# Patient Record
Sex: FEMALE | Race: WHITE | Employment: FULL TIME | URBAN - METROPOLITAN AREA
[De-identification: names, ages, dates, MRNs, and addresses within clinical notes are randomized per-mention and may not be internally consistent; named-entity substitution may affect disease eponyms.]

---

## 2017-05-23 ENCOUNTER — EMPLOYEE WELLNESS (OUTPATIENT)
Dept: OTHER | Age: 32
End: 2017-05-23

## 2017-05-23 LAB
CHOLESTEROL, TOTAL: 179 MG/DL (ref 0–199)
GLUCOSE BLD-MCNC: 76 MG/DL (ref 70–99)
HDLC SERPL-MCNC: 42 MG/DL (ref 40–60)
LDL CHOLESTEROL CALCULATED: 112 MG/DL
TRIGL SERPL-MCNC: 124 MG/DL (ref 0–150)

## 2017-06-16 ENCOUNTER — HOSPITAL ENCOUNTER (OUTPATIENT)
Dept: MRI IMAGING | Age: 32
Discharge: HOME | End: 2017-06-16
Attending: INTERNAL MEDICINE | Admitting: INTERNAL MEDICINE

## 2017-06-16 DIAGNOSIS — M51.9 THORACIC, THORACOLUMBAR AND LUMBOSACRAL INTERVERTEBRAL DISC DISORDER: ICD-10-CM

## 2017-06-16 DIAGNOSIS — M51.9 DISC DISORDER: ICD-10-CM

## 2017-06-16 DIAGNOSIS — M51.9 INTERVERTEBRAL DISC DISORDER: ICD-10-CM

## 2017-06-20 ENCOUNTER — TELEPHONE (OUTPATIENT)
Dept: SURGERY | Age: 32
End: 2017-06-20

## 2017-08-15 LAB
HEPATITIS B SURFACE ANTIGEN INTERPRETATION: NORMAL
HEPATITIS C ANTIBODY INTERPRETATION: NORMAL

## 2017-08-16 LAB
HIV-1 AND HIV-2 ANTIBODIES: NORMAL
RPR: NORMAL

## 2017-08-17 LAB
HPV COMMENT: NORMAL
HPV TYPE 16: NOT DETECTED
HPV TYPE 18: NOT DETECTED
HPVOH (OTHER TYPES): NOT DETECTED

## 2018-03-07 ENCOUNTER — NURSE TRIAGE (OUTPATIENT)
Dept: OTHER | Facility: CLINIC | Age: 33
End: 2018-03-07

## 2018-03-20 VITALS — WEIGHT: 176 LBS | BODY MASS INDEX: 33.25 KG/M2

## 2019-04-27 ENCOUNTER — HOSPITAL ENCOUNTER (OUTPATIENT)
Dept: GENERAL RADIOLOGY | Age: 34
Discharge: HOME | End: 2019-04-27
Payer: COMMERCIAL

## 2019-04-27 ENCOUNTER — HOSPITAL ENCOUNTER (OUTPATIENT)
Age: 34
Discharge: HOME | End: 2019-04-27
Payer: COMMERCIAL

## 2019-04-27 DIAGNOSIS — M54.2 NECK PAIN: ICD-10-CM

## 2019-04-27 PROCEDURE — 72040 X-RAY EXAM NECK SPINE 2-3 VW: CPT

## 2019-05-06 ENCOUNTER — EMPLOYEE WELLNESS (OUTPATIENT)
Dept: OTHER | Age: 34
End: 2019-05-06

## 2019-05-06 LAB
CHOLESTEROL, TOTAL: 182 MG/DL (ref 0–199)
GLUCOSE BLD-MCNC: 93 MG/DL (ref 70–99)
HDLC SERPL-MCNC: 47 MG/DL (ref 40–60)
LDL CHOLESTEROL CALCULATED: 112 MG/DL
TRIGL SERPL-MCNC: 113 MG/DL (ref 0–150)

## 2019-05-13 VITALS — BODY MASS INDEX: 29.85 KG/M2 | WEIGHT: 158 LBS

## 2019-06-06 LAB
CANDIDA SPECIES, DNA PROBE: NORMAL
GARDNERELLA VAGINALIS, DNA PROBE: NORMAL
TRICHOMONAS VAGINALIS DNA: NORMAL

## 2019-09-14 ENCOUNTER — HOSPITAL ENCOUNTER (OUTPATIENT)
Age: 34
Discharge: HOME | End: 2019-09-14
Payer: COMMERCIAL

## 2019-09-14 PROCEDURE — 87390 HIV-1 AG IA: CPT

## 2019-09-14 PROCEDURE — 86702 HIV-2 ANTIBODY: CPT

## 2019-09-14 PROCEDURE — 86701 HIV-1ANTIBODY: CPT

## 2019-09-14 PROCEDURE — 86780 TREPONEMA PALLIDUM: CPT

## 2019-09-14 PROCEDURE — 87340 HEPATITIS B SURFACE AG IA: CPT

## 2019-09-14 PROCEDURE — 86803 HEPATITIS C AB TEST: CPT

## 2019-09-14 PROCEDURE — 36415 COLL VENOUS BLD VENIPUNCTURE: CPT

## 2019-09-15 LAB
HEPATITIS B SURFACE ANTIGEN INTERPRETATION: NORMAL
HEPATITIS C ANTIBODY INTERPRETATION: NORMAL
HIV AG/AB: NORMAL
HIV ANTIGEN: NORMAL
HIV-1 ANTIBODY: NORMAL
HIV-2 AB: NORMAL
TOTAL SYPHILLIS IGG/IGM: NORMAL

## 2019-10-29 ENCOUNTER — TELEPHONE (OUTPATIENT)
Dept: PHARMACY | Age: 34
End: 2019-10-29

## 2019-12-12 ENCOUNTER — OFFICE VISIT (OUTPATIENT)
Dept: ENT CLINIC | Age: 34
End: 2019-12-12
Payer: COMMERCIAL

## 2019-12-12 VITALS
HEIGHT: 61 IN | SYSTOLIC BLOOD PRESSURE: 116 MMHG | WEIGHT: 168.6 LBS | HEART RATE: 92 BPM | DIASTOLIC BLOOD PRESSURE: 72 MMHG | BODY MASS INDEX: 31.83 KG/M2 | TEMPERATURE: 97.3 F

## 2019-12-12 DIAGNOSIS — H65.91 FLUID LEVEL BEHIND TYMPANIC MEMBRANE OF RIGHT EAR: Primary | ICD-10-CM

## 2019-12-12 DIAGNOSIS — H90.2 MIDDLE EAR CONDUCTIVE HEARING LOSS: ICD-10-CM

## 2019-12-12 PROCEDURE — 69420 INCISION OF EARDRUM: CPT | Performed by: OTOLARYNGOLOGY

## 2019-12-12 PROCEDURE — 99243 OFF/OP CNSLTJ NEW/EST LOW 30: CPT | Performed by: OTOLARYNGOLOGY

## 2019-12-12 RX ORDER — METHYLPREDNISOLONE 4 MG/1
TABLET ORAL
Qty: 1 KIT | Refills: 0 | Status: SHIPPED | OUTPATIENT
Start: 2019-12-12 | End: 2019-12-26 | Stop reason: ALTCHOICE

## 2019-12-26 ENCOUNTER — OFFICE VISIT (OUTPATIENT)
Dept: ENT CLINIC | Age: 34
End: 2019-12-26
Payer: COMMERCIAL

## 2019-12-26 VITALS
SYSTOLIC BLOOD PRESSURE: 109 MMHG | HEIGHT: 60 IN | WEIGHT: 168 LBS | TEMPERATURE: 97.2 F | HEART RATE: 79 BPM | BODY MASS INDEX: 32.98 KG/M2 | DIASTOLIC BLOOD PRESSURE: 67 MMHG

## 2019-12-26 DIAGNOSIS — H93.8X1 EAR FULLNESS, RIGHT: ICD-10-CM

## 2019-12-26 DIAGNOSIS — H69.81 DYSFUNCTION OF RIGHT EUSTACHIAN TUBE: Primary | ICD-10-CM

## 2019-12-26 PROCEDURE — 99212 OFFICE O/P EST SF 10 MIN: CPT | Performed by: OTOLARYNGOLOGY

## 2020-01-16 ENCOUNTER — OFFICE VISIT (OUTPATIENT)
Dept: DERMATOLOGY | Age: 35
End: 2020-01-16
Payer: COMMERCIAL

## 2020-01-16 PROBLEM — D48.5 NEOPLASM OF UNCERTAIN BEHAVIOR OF SKIN: Status: ACTIVE | Noted: 2020-01-16

## 2020-01-16 PROCEDURE — 99202 OFFICE O/P NEW SF 15 MIN: CPT | Performed by: DERMATOLOGY

## 2020-01-16 PROCEDURE — 11102 TANGNTL BX SKIN SINGLE LES: CPT | Performed by: DERMATOLOGY

## 2020-01-16 NOTE — LETTER
[facility]at[facility]  [address] [place] [address]  Phone: [phone]  Fax: [phone]    Kamini Fuentes MD        January 16, 2020     Kurt Colunga, [address] [facility],[address] [place] [address]    Patient: Javier Broussard  MR Number: [id number]  YOB: 1985  Date of Visit: 1/16/2020    Dear Dr. Kurt Colunga:    Thank you for the request for consultation for Javier Broussard to me for the evaluation of lesion. Below are the relevant portions of my assessment and plan of care. If you have questions, please do not hesitate to call me. I look forward to following Melodie Watkins along with you.     Sincerely,        Kamini Fuentes MD

## 2020-01-16 NOTE — PROGRESS NOTES
-Aquaphor ointment and bandage applied    -Specimen(s) sent to dermatopathology lab for analysis   -Edu: woundcare, bleeding, infection, scar       Return to Clinic:  prn; may want to have botox  Discussed plan with patient and/or primary caretaker. Patient to call clinic with any questions / concerns. Reviewed side effects of treatment(s) and/or medication(s) with patient and/or primary caretaker.    AVS provided or is available on [facility]   ____________________________________________________________________________   Helen Sandhu MD, MPH, FAAD  [facility], [address] [address]

## 2020-01-20 LAB — DERMATOLOGY PATHOLOGY REPORT: NORMAL

## 2020-01-24 ENCOUNTER — TELEPHONE (OUTPATIENT)
Dept: DERMATOLOGY | Age: 35
End: 2020-01-24

## 2020-01-24 NOTE — TELEPHONE ENCOUNTER
Biopsy Results from 01/16/2020 (scanned into media):     1. Location: [facility]      Diagnosis: Angiofibroma      Plan: No further treatment needed     Left Benign results on patient's voicemail (ok per HIPAA).  Left message instructing to call office with any questions

## 2020-02-11 ENCOUNTER — TELEPHONE (OUTPATIENT)
Dept: DERMATOLOGY | Age: 35
End: 2020-02-11

## 2020-02-11 NOTE — TELEPHONE ENCOUNTER
Dr. Kalpana Harry patient    Patient called and would like to schedule for botox. She also has a cyst on her back she would like removed.     Call back # [phone]

## 2020-03-04 ENCOUNTER — OFFICE VISIT (OUTPATIENT)
Dept: DERMATOLOGY | Age: 35
End: 2020-03-04

## 2020-03-04 PROBLEM — L98.8 RHYTIDES: Status: ACTIVE | Noted: 2020-03-04

## 2020-03-04 PROCEDURE — DM00130 PR DERM ONLY BOTOX INJ LEVEL 4: Performed by: DERMATOLOGY

## 2020-04-09 ENCOUNTER — TELEPHONE (OUTPATIENT)
Dept: PHARMACY | Age: 35
End: 2020-04-09

## 2020-04-10 ENCOUNTER — SCHEDULED TELEPHONE ENCOUNTER (OUTPATIENT)
Dept: PHARMACY | Age: 35
End: 2020-04-10

## 2020-04-10 RX ORDER — M-VIT,TX,IRON,MINS/CALC/FOLIC 27MG-0.4MG
1 TABLET ORAL DAILY
COMMUNITY
End: 2021-07-14

## 2020-04-10 ASSESSMENT — PROMIS GLOBAL HEALTH SCALE
IN THE PAST 7 DAYS, HOW WOULD YOU RATE YOUR FATIGUE ON AVERAGE [ON A SCALE FROM 1 (NONE) TO 5 (VERY SEVERE)]?: 2
IN THE PAST 7 DAYS, HOW WOULD YOU RATE YOUR PAIN ON AVERAGE [ON A SCALE FROM 0 (NO PAIN) TO 10 (WORST IMAGINABLE PAIN)]?: 1
IN GENERAL, HOW WOULD YOU RATE YOUR PHYSICAL HEALTH [ON A SCALE OF 1 (POOR) TO 5 (EXCELLENT)]?: 3
HOW IS THE PROMIS V1.1 BEING ADMINISTERED?: 2
IN GENERAL, HOW WOULD YOU RATE YOUR MENTAL HEALTH, INCLUDING YOUR MOOD AND YOUR ABILITY TO THINK [ON A SCALE OF 1 (POOR) TO 5 (EXCELLENT)]?: 4
TO WHAT EXTENT ARE YOU ABLE TO CARRY OUT YOUR EVERYDAY PHYSICAL ACTIVITIES SUCH AS WALKING, CLIMBING STAIRS, CARRYING GROCERIES, OR MOVING A CHAIR [ON A SCALE OF 1 (NOT AT ALL) TO 5 (COMPLETELY)]?: 5
IN GENERAL, HOW WOULD YOU RATE YOUR SATISFACTION WITH YOUR SOCIAL ACTIVITIES AND RELATIONSHIPS [ON A SCALE OF 1 (POOR) TO 5 (EXCELLENT)]?: 3
IN GENERAL, PLEASE RATE HOW WELL YOU CARRY OUT YOUR USUAL SOCIAL ACTIVITIES (INCLUDES ACTIVITIES AT HOME, AT WORK, AND IN YOUR COMMUNITY, AND RESPONSIBILITIES AS A PARENT, CHILD, SPOUSE, EMPLOYEE, FRIEND, ETC) [ON A SCALE OF 1 (POOR) TO 5 (EXCELLENT)]?: 3
SUM OF RESPONSES TO QUESTIONS 3, 6, 7, & 8: 11
IN GENERAL, WOULD YOU SAY YOUR HEALTH IS...[ON A SCALE OF 1 (POOR) TO 5 (EXCELLENT)]: 5
SUM OF RESPONSES TO QUESTIONS 2, 4, 5, & 10: 15
WHO IS THE PERSON COMPLETING THE PROMIS V1.1 SURVEY?: 0
IN THE PAST 7 DAYS, HOW OFTEN HAVE YOU BEEN BOTHERED BY EMOTIONAL PROBLEMS, SUCH AS FEELING ANXIOUS, DEPRESSED, OR IRRITABLE [ON A SCALE FROM 1 (NEVER) TO 5 (ALWAYS)]?: 3
IN GENERAL, WOULD YOU SAY YOUR QUALITY OF LIFE IS...[ON A SCALE OF 1 (POOR) TO 5 (EXCELLENT)]: 5

## 2020-04-10 NOTE — TELEPHONE ENCOUNTER
other technology to monitor. Also recommended using the restroom prior to bedtime to eliminate night time waking. Patient has no trouble going back to sleep. Reports getting 9-10 hours nightly. Encouraged focus on quality sleep. Skipping meals? No  Too much screen time? No     Considering all the ways in which this illness and health conditions may affect you at this time, how are you doing (0 = very well, 10 = very poorly): 4 - still getting migraines on a regular basis  · During the past 4 weeks, have you missed any planned activities of daily living due to condition (work/school/other plans)? No  · During the past 4 weeks, have you had to seek urgent care, ER admission, Unplanned doctor office visit, or hospital admission? No    MEDICATIONS  Current Outpatient Medications   Medication Sig Dispense Refill    Multiple Vitamins-Minerals (THERAPEUTIC MULTIVITAMIN-MINERALS) tablet Take 1 tablet by mouth daily      ELDERBERRY PO Take 1 tablet by mouth daily      Fremanezumab-vfrm (AJOVY) 225 MG/1.5ML SOSY Inject 1.5 mLs into the skin every 30 days       sertraline (ZOLOFT) 100 MG tablet Take 100 mg by mouth daily        No current facility-administered medications for this visit. Have you used other medications in the past for Migraines? Why did you change therapies? - Emgality - ineffective after a while. Patient reports migraine frequency was better on Emgality than on Ajovy, but all of a sudden Emgality stopped working  - Fioricet - ineffective  - Imitrex - ineffective    Current Specialty Medication(s): Fremanezumab (Virgil Lonnie) - started 10/3/19  Recommended dosing: Migraine prophylaxis: SubQ: 225 mg monthly or 675 mg every 3 months  Warnings/Precautions: Hypersensitivity; Cardiovascular disease (excluded from trials);  Anti-fremanezumab antibodies and neutralizing antibodies may develop  Recommended Monitoring: Number of monthly migraine days  Storage: Store refrigerated at 2°C to 8°C (36°F to 46°F) in the extent are you able to carry out your everyday physical activities such as walking, climbing stairs, carrying groceries, or moving a chair? 5   In the past 7 days how often have you been bothered by emotional problems such as feeling anxious, depressed or irritable? 3   In the past 7 days how would you rate your fatigue on average? 2   In the past 7 days how would you rate your pain on average? 1   How comfortable are you filling out medical forms by yourself? 4   What amount of pain have you experienced in the last week in your other knee/hip? 0   My BACK PAIN at the moment is 2   Mode of Collection 2   Person Completing Survey 0   PROMIS Physical Score 11   PROMIS Mental Score 15       1. Migraine Headache  Patient is currently controlled with current therapy compared to before starting therapy. Patient went from daily migraines to twice weekly with less intense migraines, but patient reports her ideal number per month is < 2. Patient has good knowledge and understanding of current medication and disease state. Dose is  appropriate based on patient weight and laboratory findings. Contraindications to therapy present? No  2. Immunizations  Immunization status: up to date and documented. 3. Drug Interactions  N/A  4. Other Identified Potential Issues  Obesity - will continue to monitor. Increased from 158 lbs (19) to 168 lbs (19). PLAN  Goals of therapy, common side effects, medication storage, and administration reviewed with patient. continue Ajovy 225 mg every month. Patient to implement phone alerts to help her take doses closer to 30 days apart. Prior authorization for Ajovy  on 3/2/20. Will follow up with pharmacy benefits/specialty pharmacy via email. Recommended monitoring to complete: Number of migraines/month  Non pharmacotherapy recommendations: Get enough quality sleep; Reduce stress; Drink plenty of water;  Avoid triggers; Regular physical exercise  Keep all scheduled

## 2020-04-20 RX ORDER — FREMANEZUMAB-VFRM 225 MG/1.5ML
1.5 INJECTION SUBCUTANEOUS
Qty: 1.5 ML | Refills: 3 | Status: SHIPPED | OUTPATIENT
Start: 2020-04-20 | End: 2021-07-14

## 2020-07-01 ENCOUNTER — TELEPHONE (OUTPATIENT)
Dept: PHARMACY | Age: 35
End: 2020-07-01

## 2020-07-01 NOTE — TELEPHONE ENCOUNTER
CLINICAL PHARMACY NOTE - SPECIALTY MEDICATION SERVICE     Yoav Delgado is a 37 y.o. female enrolled in the Specialty Medication Service. Patient's last SMS visit was 4/10/20. Per Angelica Navarro, patient is due for follow up on 7/2/20. Attempted to speak with patient to schedule follow up phone call. Left message asking patient to call back at [phone]. Will attempt again as appropriate.      Cecilia Doan PharmD, [facility] Specialty Medication Service   Phone: [phone]

## 2020-08-04 ENCOUNTER — OFFICE VISIT (OUTPATIENT)
Dept: PRIMARY CARE CLINIC | Age: 35
End: 2020-08-04
Payer: COMMERCIAL

## 2020-08-04 PROCEDURE — 99211 OFF/OP EST MAY X REQ PHY/QHP: CPT | Performed by: NURSE PRACTITIONER

## 2020-08-06 LAB — SARS-COV-2, NAA: NOT DETECTED

## 2020-10-28 ENCOUNTER — HOSPITAL ENCOUNTER (OUTPATIENT)
Dept: VASCULAR LAB | Age: 35
Discharge: HOME | End: 2020-10-28
Payer: COMMERCIAL

## 2020-10-28 ENCOUNTER — OFFICE VISIT (OUTPATIENT)
Dept: ORTHOPEDIC SURGERY | Age: 35
End: 2020-10-28
Payer: COMMERCIAL

## 2020-10-28 VITALS — HEIGHT: 61 IN | TEMPERATURE: 99.7 F | BODY MASS INDEX: 30.78 KG/M2 | WEIGHT: 163 LBS

## 2020-10-28 PROCEDURE — 99203 OFFICE O/P NEW LOW 30 MIN: CPT | Performed by: PHYSICIAN ASSISTANT

## 2020-10-28 PROCEDURE — MISCCMC2 OTS BREG CMC THUMB GUARD: Performed by: PHYSICIAN ASSISTANT

## 2020-10-28 PROCEDURE — 20610 DRAIN/INJ JOINT/BURSA W/O US: CPT | Performed by: PHYSICIAN ASSISTANT

## 2020-10-28 PROCEDURE — 93880 EXTRACRANIAL BILAT STUDY: CPT

## 2020-10-28 RX ORDER — ACETAMINOPHEN 500 MG
500 TABLET ORAL EVERY 6 HOURS PRN
COMMUNITY

## 2020-10-28 RX ORDER — TOPIRAMATE 100 MG/1
100 TABLET, FILM COATED ORAL 2 TIMES DAILY
COMMUNITY
Start: 2020-10-22 | End: 2021-12-08

## 2020-10-28 RX ORDER — LIDOCAINE HYDROCHLORIDE 10 MG/ML
5 INJECTION, SOLUTION INFILTRATION; PERINEURAL ONCE
Status: COMPLETED | OUTPATIENT
Start: 2020-10-28 | End: 2020-10-28

## 2020-10-28 RX ORDER — IBUPROFEN 200 MG
200 TABLET ORAL EVERY 6 HOURS PRN
COMMUNITY
End: 2021-07-14

## 2020-10-28 RX ORDER — BETAMETHASONE SODIUM PHOSPHATE AND BETAMETHASONE ACETATE 3; 3 MG/ML; MG/ML
12 INJECTION, SUSPENSION INTRA-ARTICULAR; INTRALESIONAL; INTRAMUSCULAR; SOFT TISSUE ONCE
Status: COMPLETED | OUTPATIENT
Start: 2020-10-28 | End: 2020-10-28

## 2020-10-28 RX ADMIN — BETAMETHASONE SODIUM PHOSPHATE AND BETAMETHASONE ACETATE 12 MG: 3; 3 INJECTION, SUSPENSION INTRA-ARTICULAR; INTRALESIONAL; INTRAMUSCULAR; SOFT TISSUE at 15:00

## 2020-10-28 RX ADMIN — LIDOCAINE HYDROCHLORIDE 5 ML: 10 INJECTION, SOLUTION INFILTRATION; PERINEURAL at 15:01

## 2020-10-28 NOTE — PROGRESS NOTES
Date of Encounter: 10/28/2020  Patient [facility] Record Number: [id number]  YOB: 1985    Chief Complaint   Patient presents with    Shoulder Pain     Left shoulder and Left thumb pain x6 mos. No known injury. Possibly from working out. History of Present Illness:  Antony Neri is a 38 y.o. female here for evaluation of her left shoulder and left hand. Her pain assessment is documented below and I reviewed this with her today. Patient states she was working out at the gym 6 months ago when she began having left shoulder and left thumb pain. She believes she might have injured herself by lifting weights. She states most of her shoulder pain is along the posterior shoulder. Pain is worse with lifting, pushing or pulling. She denies stiffness or decreased range of motion of the left shoulder. She denies pain in her neck or numbness or tingling in the left arm. Patient states most of her left hand pain is over the thenar eminence. She has pain even with rest.  Pain is worse with any type of lifting, pushing or pulling with the left hand. She denies pain in the left wrist or elbow. She denies numbness or tingling in the left hand. Pain Assessment  Location of Pain: Shoulder  Location Modifiers: Left  Severity of Pain: 7  Quality of Pain: Aching, Other (Comment)(Burning)  Duration of Pain: Persistent  Frequency of Pain: Constant  Date Pain First Started: (x6 mos)  Aggravating Factors: Other (Comment)(Touching, wearing a shoulder bag, laying on it)  Limiting Behavior: Yes  Relieving Factors:  Other (Comment)(Nothing)  Result of Injury: No  Work-Related Injury: No  Are there other pain locations you wish to document?: No    Past Medical History:   Diagnosis Date    Arthritis     Hearing loss     Lumbar dislocation     Migraines     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    Tinnitus        Past Surgical History:   Procedure Laterality Date  MOUTH SURGERY      OTHER SURGICAL HISTORY      essure procedure    TONSILLECTOMY         Current Outpatient Medications   Medication Sig Dispense Refill    topiramate (TOPAMAX) 100 MG tablet       ibuprofen (ADVIL;MOTRIN) 200 MG tablet Take 200 mg by mouth every 6 hours as needed for Pain      acetaminophen (TYLENOL) 500 MG tablet Take 500 mg by mouth every 6 hours as needed for Pain      sertraline (ZOLOFT) 100 MG tablet Take 100 mg by mouth daily       Fremanezumab-vfrm (AJOVY) 225 MG/1.5ML SOSY Inject 1.5 mLs into the skin every 30 days (Patient not taking: Reported on 10/28/2020) 1.5 mL 3    Multiple Vitamins-Minerals (THERAPEUTIC MULTIVITAMIN-MINERALS) tablet Take 1 tablet by mouth daily      ELDERBERRY PO Take 1 tablet by mouth daily       No current facility-administered medications for this visit. Allergies, social and family histories were reviewed and updated as appropriate. Review of Systems:  Relevant review of systems reviewed and available in the patient's chart under the 'MEDIA' tab. Vital Signs:  Temp 99.7 °F (37.6 °C) (Infrared)   Ht 5' 1\" (1.549 m)   Wt 163 lb (73.9 kg)   BMI 30.80 kg/m²     General Exam:   Constitutional: Patient is adequately groomed with no evidence of malnutrition  Mental Status: The patient is oriented to time, place and person. The patient's mood and affect are appropriate. Lymphatic: The lymphatic examination bilaterally reveals all areas to be without enlargement or induration. Neurological: The patient has good coordination and balance. There is no focal weakness or sensory deficit. Left Shoulder Examination:    Inspection:  No ptosis and normal deltoid contour. No gross atrophy in any particular myotome. There is no obvious swelling or joint effusion of the shoulder. There are no abrasions, lacerations, contusions, hematomas or ecchymosis. There is no erythema, induration or warmth to suggest an infectious process.      Palpation: Patient has mild tenderness on palpation of the posterior and lateral shoulder. Range of Motion: Forward flexion: 180°    Abduction: 150°   External Rotation with elbow at Side: 90°   Internal rotation: T8    Strength: Forward flexion: 4+ / 5                   Abduction: 4+ / 5        Internal Rotation: 4+ / 5        External Rotation with elbow at side: 4+ / 5    Special Tests:       Shoulder shrug strength is 5 over 5 equal bilaterally.  Capillary refill is brisk.  Load-and-shift test is negative.  Neer's impingement is mildly positive   Hopson's maneuver is mildly positive   Empty can testing is mildly positive   Veronica's test is negative   Drop arm sign is negative     Apprehension and relocation is negative.  Anterior and posterior glide are equal bilaterally.  Negative sulcus sign.  No signs of any significant multidirectional instability.  There is no scapular winging.  There is no muscle atrophy of the latissimus dorsi, the deltoid, the periscapular musculature,The trapezius musculature or the pectoralis musculature. Cervical spine: No tenderness over the spinous processes or step-offs, normal flexion and extension, lateral rotation and bending without increasing pain or radicular symptoms. Spurling's sign negative. Skin: There are no rashes, ulcerations or lesions. Sensation to light touch intact. Circulation: The limb is warm and well perfused. Distal pulses intact. Capillary refill is intact. Edema: none. Venous stasis changes: none. Left Hand Examination:    Inspection:  Normal muscle contours and no significant limb length discrepancy. No gross atrophy in any particular myotome. There is no obvious swelling of the hand or wrist. There are no abrasions, lacerations, contusions, hematomas or ecchymosis. There is no erythema, induration or warmth to suggest an infectious process.      Palpation: Patient has moderate tenderness on palpation over the thenar eminence. She specifically denies tenderness on palpation of the MCP or IP joint of the left thumb. She denies anatomical snuffbox tenderness on palpation. Range of Motion: Patient has full range of motion of the left thumb and all other fingers. She has full range of motion of her left wrist without pain. Strength:  4+/5    Special Tests:   Kolleen  Test: (de Quervain's disease) - negative   Phalen Test (median nerve compression) - negative   Tinel's Sign (median nerve compression) - negative   Jalil's Test (arterial occlusion) - negative   Valgus Stress Test (collateral ligament tear) - negative   Varus Stress Test (collateral ligament tear) - negative    De La Cruz Test 'Scaphoid Shift Test (carpal ligament tear) - negative   Hussein's Sign (lunate dislocation) - negative    Skin: There are no rashes, ulcerations or lesions. Sensation to light touch intact    Circulation: The limb is warm and well perfused. Distal pulses intact. Capillary refill is intact. Edema: none. Venous stasis changes: none. Radiology:      X-rays obtained and reviewed in office:    Views: 3 (AP, Y, Axillary) left shoulder  Impression: No evidence for acute fracture or dislocation. No lytic or blastic areas in the proximal humeral metaphysis / diaphysis. Acromioclavicular and glenohumeral joints show minimal arthritic changes    Views: 3 view left hand including AP, lateral and oblique  Impression: There are no acute fractures or dislocations. There is mild subluxation of the first [facility] joint. Impression:  Encounter Diagnoses   Name Primary?  Tendinitis of left rotator cuff     Chronic left shoulder pain     Sprain of carpometacarpal joint of left hand, initial encounter Yes    Chronic pain of left hand        Office Procedures:  Orders Placed This Encounter   Procedures    [facility] Thumb Guard $20     Scheduling Instructions:      Patient was supplied a [employer]reg [facility] Thumb Guard.   This retail item was supplied to provide functional support and assist in protecting the affected area. Verbal and written instructions for the use of and application of this item were provided. The patient was educated and fit by a healthcare professional with expert knowledge and specialization in brace application. They were instructed to contact the office immediately should the equipment       result in increased pain, decreased sensation, increased swelling or worsening of the condition.  XR SHOULDER LEFT (MIN 2 VIEWS)     3v     Standing Status:   Future     Number of Occurrences:   1     Standing Expiration Date:   11/28/2020     Order Specific Question:   Reason for exam:     Answer:   Thumb pain    XR HAND LEFT (MIN 3 VIEWS)     3V Lt Hand     Standing Status:   Future     Number of Occurrences:   1     Standing Expiration Date:   10/28/2021     Order Specific Question:   Reason for exam:     Answer:   Hand Pain    VT ARTHROCENTESIS ASPIR&/INJ MAJOR JT/BURSA W/O US       Injection:  After discussing the risks and benefits of cortisone injection including increased pain, drug reaction, infection, bleeding, blood glucose elevation, lack of improvement and neurovascular injury she agreed to receive one today. Questions were encouraged and answered. The correct patient, procedure, site and side were identified. The left shoulder was prepped with betadine and 2 mL of betamethasone (12mg) mixed with 5 mL 1% lidocaine plain (50mg) were carefully aspirated and injected with half the volume into the subacromial space and the remaining half into the intra-articular space under aseptic technique. The skin was then cleaned again with alcohol and a sterile adhesive dressing was applied. She tolerated this well and was instructed regarding post-injection care of icing and decreased activity as necessary.       Treatment Plan:          Patient presents with 6 months of left shoulder and left thumb pain after lifting weights at the gym.  X-rays are unremarkable. She clinically has a mild rotator cuff tendinitis. She has been trying ibuprofen, Tylenol, Voltaren gel, rest, ice and heat without relief of her pain. We discussed other conservative treatment options and she elected to proceed with cortisone injection. This was completed as recorded above in the procedure note. Patient is given a handout of home exercises to complete. We could consider formal physical therapy in the future if pain does not improve. As far as her thumb is concerned, x-rays show very mild subluxation of the first [facility] joint. This was nontraumatic. Patient is fitted for a neoprene thumb brace. She will continue to rest and ice the left thumb. If pain does not improve within the next 4 to 6 weeks she should follow up with Dr. Babar Salgado or Sierra Garcia. Georges Rubinstein was informed of the results of any imaging. We discussed treatment options and a time was given to answer questions. A plan was proposed and Georges Rubinstein understand and accepts this course of care. Electronically signed by Aashish Julian PA-C on 12/33/2294  Board Certified [facility]    Please note that portions of this note were completed with a voice recognition program.  Efforts were made to edit the dictations but occasionally words are mis-transcribed.

## 2021-01-17 ENCOUNTER — NURSE TRIAGE (OUTPATIENT)
Dept: OTHER | Facility: CLINIC | Age: 36
End: 2021-01-17

## 2021-01-17 NOTE — TELEPHONE ENCOUNTER
Brief description of triage: has symptoms of covid, aching, burning pain in chest.     Triage indicates for patient to go to ED now    Care advice provided, patient verbalizes understanding; denies any other questions or concerns; instructed to call back for any new or worsening symptoms. Attention Provider: Thank you for allowing me to participate in the care of your patient. The patient was connected to triage in response to possible COVID-19 symptoms. Please do not respond through this encounter as the response is not directed to a shared pool. Reason for Disposition   MODERATE difficulty breathing (e.g., speaks in phrases, SOB even at rest, pulse 100-120)    Answer Assessment - Initial Assessment Questions  1. COVID-19 DIAGNOSIS: \"Who made your Coronavirus (COVID-19) diagnosis? \" \"Was it confirmed by a positive lab test?\" If not diagnosed by a HCP, ask \"Are there lots of cases (community spread) where you live? \" (See public health department website, if unsure)      n/a  2. COVID-19 EXPOSURE: \"Was there any known exposure to COVID before the symptoms began? \" CDC Definition of close contact: within 6 feet (2 meters) for a total of 15 minutes or more over a 24-hour period. ER nurse  3. ONSET: \"When did the COVID-19 symptoms start?\"       1/16/2021  4. WORST SYMPTOM: \"What is your worst symptom? \" (e.g., cough, fever, shortness of breath, muscle aches)      Chest pressure  5. COUGH: \"Do you have a cough? \" If so, ask: \"How bad is the cough? \"        Dry nagging  6. FEVER: \"Do you have a fever? \" If so, ask: \"What is your temperature, how was it measured, and when did it start? \"      Feels feverish/chills  7. RESPIRATORY STATUS: \"Describe your breathing? \" (e.g., shortness of breath, wheezing, unable to speak)       sob  8. BETTER-SAME-WORSE: Justine Mash you getting better, staying the same or getting worse compared to yesterday? \"  If getting worse, ask, \"In what way? \"      worse  9.  HIGH RISK DISEASE: \"Do you

## 2021-01-18 ENCOUNTER — OFFICE VISIT (OUTPATIENT)
Dept: PRIMARY CARE CLINIC | Age: 36
End: 2021-01-18
Payer: COMMERCIAL

## 2021-01-18 DIAGNOSIS — Z20.828 EXPOSURE TO SARS-ASSOCIATED CORONAVIRUS: Primary | ICD-10-CM

## 2021-01-18 PROCEDURE — 99211 OFF/OP EST MAY X REQ PHY/QHP: CPT | Performed by: NURSE PRACTITIONER

## 2021-01-18 NOTE — PATIENT INSTRUCTIONS
You have received a viral test for COVID-19. Below is education on quarantine per the CDC guidelines. For any symptoms, seek care from your PCP, call [phone] to establish care with a doctor, or go directly to an urgent care or the emergency room. Test results will take 2-7 days and will be sent to you in your [employer] account. If you test positive, you will be contacted via phone. If you test negative, the ONLY communication will be through [address] GO TO [place] AND SIGN UP FOR [employer]  (LOWER LEFT OF THE HOME PAGE)  No test is 100%. If you have symptoms, you should follow the guidance of quarantine as previously stated. You can still be contagious if you have symptoms. Your [facility] will reach out to you if you have a positive result. They will provide you with a return to work date and note. If you were tested for a pre-op, then you should remain in quarantine until your procedure. How do I know if I need to be in quarantine? If you live in a community where COVID-19 is or might be spreading (currently, that is virtually everywhere in the [place])  Be alert for symptoms. Watch for fever, cough, shortness of breath, or other symptoms of COVID-19.  ? Take your temperature if symptoms develop. ? Practice social distancing. Maintain 6 feet of distance from others and stay out of crowded places. ? Follow CDC guidance if symptoms develop. If you feel healthy but:  ? Recently had close contact with a person with COVID-19 you need to Quarantine:  ? Stay home until 14 days after your last exposure. ? Check your temperature twice a day and watch for symptoms of COVID-19.  ? If possible, stay away from people who are at higher-risk for getting very sick from COVID-19. Stay Home and Monitor Your Health if you:  ? Have been diagnosed with COVID-19, or  ? Are waiting for test results, or  ?  Have cough, fever, or shortness of breath, or symptoms of COVID-19 When You Can be Around Others After You Had or Likely Had COVID-19     If you have or think you might have COVID-19, it is important to stay home and away from other people. Staying away from others helps stop the spread of COVID-19. If you have an emergency warning sign (including trouble breathing), get emergency medical care immediately. When you can be around others (end home isolation) depends on different factors for different situations. Find CDC's recommendations for your situation below. I think or know I had COVID-19, and I had symptoms  You can be with others after  ? 3 days with no fever and  ? Respiratory symptoms have improved (e.g. cough, shortness of breath) and  ? 10 days since symptoms first appeared  Depending on your healthcare provider's advice and availability of testing, you might get tested to see if you still have COVID-19. If you will be tested, you can be around others when you have no fever, respiratory symptoms have improved, and you receive two negative test results in a row, at least 24 hours apart. I tested positive for COVID-19 but had no symptoms  If you continue to have no symptoms, you can be with others after:  ? 10 days have passed since test or 14 days since your exposure test   Depending on your healthcare provider's advice and availability of testing, you might get tested to see if you still have COVID-19. If you will be tested, you can be around others after you receive two negative test results in a row, at least 24 hours apart. If you develop symptoms after testing positive, follow the guidance above for I think or know I had COVID, and I had symptoms.   For Anyone Who Has Been Around a Person with COVID-19  It is important to remember that anyone who has close contact with someone with COVID-19 should stay home for 14 days after exposure based on the time it takes to develop illness. Testing is not necessary.     www.cdc.gov/coronavirus/2019-ncov/index.html

## 2021-01-18 NOTE — PROGRESS NOTES
Ary Huerta received a viral test for COVID-19. They were educated on isolation and quarantine as appropriate. For any symptoms, they were directed to seek care from their PCP, given contact information to establish with a doctor, directed to an urgent care or the emergency room.
0

## 2021-01-19 LAB — SARS-COV-2, NAA: NOT DETECTED

## 2021-02-08 ENCOUNTER — HOSPITAL ENCOUNTER (OUTPATIENT)
Age: 36
Discharge: HOME | End: 2021-02-08
Payer: COMMERCIAL

## 2021-02-08 LAB
BACTERIA: ABNORMAL /HPF
BILIRUBIN URINE: NEGATIVE
BLOOD, URINE: NEGATIVE
CLARITY: ABNORMAL
COLOR: YELLOW
EPITHELIAL CELLS, UA: 2 /HPF (ref 0–5)
GLUCOSE URINE: NEGATIVE MG/DL
HYALINE CASTS: 2 /LPF (ref 0–8)
KETONES, URINE: NEGATIVE MG/DL
LEUKOCYTE ESTERASE, URINE: ABNORMAL
MICROSCOPIC EXAMINATION: YES
NITRITE, URINE: NEGATIVE
PH UA: 6 (ref 5–8)
PROTEIN UA: NEGATIVE MG/DL
RBC UA: 1 /HPF (ref 0–4)
SPECIFIC GRAVITY UA: 1.02 (ref 1–1.03)
URINE TYPE: ABNORMAL
UROBILINOGEN, URINE: 0.2 E.U./DL
WBC UA: 34 /HPF (ref 0–5)

## 2021-02-08 PROCEDURE — 87086 URINE CULTURE/COLONY COUNT: CPT

## 2021-02-08 PROCEDURE — 81001 URINALYSIS AUTO W/SCOPE: CPT

## 2021-02-10 LAB — URINE CULTURE, ROUTINE: NORMAL

## 2021-02-17 ENCOUNTER — HOSPITAL ENCOUNTER (OUTPATIENT)
Dept: ULTRASOUND IMAGING | Age: 36
Discharge: HOME | End: 2021-02-17
Payer: COMMERCIAL

## 2021-02-17 DIAGNOSIS — L72.9 CYST OF SKIN: ICD-10-CM

## 2021-02-17 PROCEDURE — 76536 US EXAM OF HEAD AND NECK: CPT

## 2021-06-01 ENCOUNTER — HOSPITAL ENCOUNTER (OUTPATIENT)
Dept: NEUROLOGY | Age: 36
Discharge: HOME | End: 2021-06-01
Payer: COMMERCIAL

## 2021-06-01 DIAGNOSIS — R20.2 PARESTHESIA OF SKIN: ICD-10-CM

## 2021-06-01 PROCEDURE — 95886 MUSC TEST DONE W/N TEST COMP: CPT | Performed by: PSYCHIATRY & NEUROLOGY

## 2021-06-01 PROCEDURE — 95909 NRV CNDJ TST 5-6 STUDIES: CPT

## 2021-06-01 PROCEDURE — 95886 MUSC TEST DONE W/N TEST COMP: CPT

## 2021-06-01 PROCEDURE — 95909 NRV CNDJ TST 5-6 STUDIES: CPT | Performed by: PSYCHIATRY & NEUROLOGY

## 2021-06-01 NOTE — PROCEDURES
[facility] [address]                     [address] [place], [place] [address]                             ELECTROMYOGRAM REPORT    PATIENT NAME: Grecia Pereira             :        1985  MED REC NO:   [id number]                          ROOM:  ACCOUNT NO:   [de-identified]                           ADMIT DATE: 2021  PROVIDER:     Mariano Pruitt MD    DATE OF EM2021    REASON FOR EMG:  Bilateral leg numbness. SUMMARY:  Bilateral peroneal motor nerve studies were normal.  Bilateral  posterior tibial motor nerve studies were normal.  Bilateral sural  sensory nerve studies were normal.  Needle EMG of several muscles in  both lower extremities was normal.  Needle EMG of the lumbar paraspinal  muscles bilaterally was normal as well. EMG DIAGNOSIS:  This is a normal EMG and nerve conduction study of both  lower extremities. There is no electrophysiological evidence for  peripheral neuropathy or lumbar radiculopathy in this study.         Whit Hedrick MD    D: 2021 13:23:44       T: 2021 13:29:36     [email or web address]  Job#: [id number]     Doc#: [id number]    CC:

## 2021-07-14 ENCOUNTER — OFFICE VISIT (OUTPATIENT)
Dept: NEUROLOGY | Age: 36
End: 2021-07-14
Payer: COMMERCIAL

## 2021-07-14 VITALS
HEART RATE: 78 BPM | BODY MASS INDEX: 27 KG/M2 | DIASTOLIC BLOOD PRESSURE: 70 MMHG | HEIGHT: 61 IN | SYSTOLIC BLOOD PRESSURE: 103 MMHG | WEIGHT: 143 LBS

## 2021-07-14 DIAGNOSIS — G43.009 MIGRAINE WITHOUT AURA AND WITHOUT STATUS MIGRAINOSUS, NOT INTRACTABLE: ICD-10-CM

## 2021-07-14 DIAGNOSIS — R20.0 BILATERAL LEG NUMBNESS: Primary | ICD-10-CM

## 2021-07-14 PROCEDURE — 99244 OFF/OP CNSLTJ NEW/EST MOD 40: CPT | Performed by: PSYCHIATRY & NEUROLOGY

## 2021-07-14 RX ORDER — GABAPENTIN 300 MG/1
300 CAPSULE ORAL 2 TIMES DAILY
COMMUNITY
Start: 2021-05-12 | End: 2021-12-08

## 2021-07-14 NOTE — LETTER
[facility] Neurology  [address] [place] [address] [address] [address]  Phone: [phone]  Fax: [phone]           Jonathon Sanchez MD      July 14, 2021     Patient: Parris Pinto   MR Number: [id number]   YOB: 1985   Date of Visit: 7/14/2021       Dear Dr. Keene Guess:    Thank you for referring Parris Pinto to me for evaluation/treatment. Below are the relevant portions of my assessment and plan of care. If you have questions, please do not hesitate to call me. I look forward to following Kenyetta Sawyer along with you.     Sincerely,    MD Jonathon Phelps MD    CC providers:  Debo Rojas MD  [address] [place] [id number]  Via In [facility]

## 2021-07-14 NOTE — PROGRESS NOTES
NEUROLOGY CONSULTATION     Chief Complaint   Patient presents with   [facility] Patient     dr Alex Pepper for paresthesia of lower extremity had emg was normal, still having pins and needles now having pain in legs       HISTORY OF PRESENT ILLNESS :    Kayden Salguero is a 38 y.o. female who is referred by Dr. Alex Pepper   History was obtained from patient  Patient was referred for evaluation of tingling and numbness in her feet. Patient states that the symptoms started approximately in February 2021. Patient started with tingling and numb feeling in her feet. No other symptoms seem to be a little worse and she also has some vague pain around her left knee. Patient had an EMG and nerve conduction study of the left leg done in the hospital which was entirely normal and there was no evidence of peripheral neuropathy. Patient has migraine headaches. She used to be on Ajovy but now she is on topiramate on a fairly high dose at 100 mg twice daily. Patient has had mild disc disease in the back but it has not bothered her recently. She has occasional sciatic pain in her legs but she feels this is very different than her usual sciatic pain.     REVIEW OF SYSTEMS    Constitutional:  []   Chills   [x]  Fatigue   []  Fevers   []  Malaise   []  Weight loss     [] Denies all of the above    Eyes:  []  Double vision   []  Blurry vision     [x] Denies all of the above    Ears, nose, mouth, throat, and face:   [] Hearing loss    []   Hoarseness      []  Snoring    []  Tinnitus       [x] Denies all of the above     Respiratory:   []  Cough    []  Shortness of breath         [x] Denies all of the above     Cardiovascular:   []  Chest pain    []  Exertional chest pressure/discomfort           [] Palpitations    []  Syncope     [x] Denies all of the above    Gastrointestinal:   []  Abdominal pain   []  Constipation    []  Diarrhea    []   Dysphagia [x] Denies all of the above    Genitourinary:      []  Frequency   []  Hematuria     []  Urinary incontinence           [x] Denies all of the above     Hematologic/lymphatic:  []  Bleeding    []  Easy bruising   []  Anemia  [x] Denies all of the above     Musculoskeletal:   [x] Back pain       []  Myalgias    [x]  Neck pain           [] Denies all of the above    Neurological: As noted in HPI    Behavioral/Psych:   [] Anxiety    [x]  Depression     []  Mood swings     [] Denies all of the above     Endocrine:   []  Temperature intolerance     [x] Fatigue      [] Denies all of the above     Allergic/Immunologic:   [] Hay fever    [x] Denies all of the above     Past Medical History:   Diagnosis Date    Arthritis     Hearing loss     Lumbar dislocation     Migraines     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    Tinnitus      Family History   Problem Relation Age of Onset    Heart Disease Mother     Other Father         ALS     Social History     Socioeconomic History    Marital status:      Spouse name: None    Number of children: None    Years of education: None    Highest education level: None   Occupational History    Occupation: RN   Tobacco Use    Smoking status: Never Smoker    Smokeless tobacco: Never Used    Tobacco comment: quit many years ago   Vaping Use    Vaping Use: Never used   Substance and Sexual Activity    Alcohol use: No     Comment: occ    Drug use: No    Sexual activity: Not Currently   Other Topics Concern    None   Social History Narrative    None     Social Determinants of Health     Financial Resource Strain:     Difficulty of Paying Living Expenses:    Food Insecurity:     Worried About Running Out of Food in the Last Year:     Ran Out of Food in the Last Year:    Transportation Needs:     Lack of Transportation (Medical):      Lack of Transportation (Non-Medical):    Physical Activity:     Days of Exercise per Week:     Minutes of Exercise per Session:    Stress:     Feeling of Stress :    Social Connections:     Frequency of Communication with Friends and Family:     Frequency of Social Gatherings with Friends and Family:     Attends Taoist Services:     Active Member of Clubs or Organizations:     Attends Club or Organization Meetings:     Marital Status:    Intimate Partner Violence:     Fear of Current or Ex-Partner:     Emotionally Abused:     Physically Abused:     Sexually Abused:        PHYSICAL EXAMINATION:  /70   Pulse 78   Ht 5' 1\" (1.549 m)   Wt 143 lb (64.9 kg)   BMI 27.02 kg/m²   Appearance: Well appearing, well nourished and in no distress  Mental Status Exam: Patient is alert, oriented to person, place and time. Recent and remote memory is normal  Fund of Knowledge is normal  Attention/concentration is normal.   Speech : No dysarthria  Language : No aphasia  Funduscopic Exam: sharp disc margins  Cranial Nerves:   II: Visual fields:  Full to confrontation  III: Pupils:  equal, round, reactive to light  III,IV,VI: Extra Ocular Movements are intact. No nystagmus  V: Facial sensation is intact to pin prick and light touch  VII: Facial strength and movements: intact and symmetric smile,cheek puffing and eyebrow elevation  VIII: Hearing:  Intact to finger rub bilaterally  IX: Palate  elevation is symmetric  XI: Shoulder shrug is intact  XII: Tongue movements are normal  Motor:  Muscle tone and bulk are normal.   Strength is symmetrical 5/5 in all four extremities. Sensory: Intact to light touch and  pin prick in all four extremities  Coordination:  Normal  Finger to Nose and Heel to Shin bilaterally    . Reflexes:  DTR +2 and symmetric bilaterally  Plantar response: Flexor bilaterally  Gait: Gait and station is normal. Patient can toe/ heel and tandem walk without difficulty  Romberg: negative  Vascular: No carotid bruit bilaterally        DATA:  LABS:  General Labs:    CBC:   Lab Results   Component Value Date    WBC 8.3 08/28/2018    RBC 4.06 08/28/2018    HGB 13.4 08/28/2018    HCT 39.6 08/28/2018    MCV 97.5 08/28/2018    MCH 33.1 08/28/2018    MCHC 33.9 08/28/2018    RDW 12.8 08/28/2018     08/28/2018    MPV 9.5 08/28/2018     BMP:    Lab Results   Component Value Date     03/30/2021    K 4.3 03/30/2021     03/30/2021    CO2 17 03/30/2021    BUN 13 03/30/2021    LABALBU 4.3 03/30/2021    CREATININE <0.5 03/30/2021    CALCIUM 8.9 03/30/2021    GFRAA >60 03/30/2021    GFRAA >60 10/18/2012    LABGLOM >60 03/30/2021    GLUCOSE 83 03/30/2021       IMPRESSION :  Numbness and tingling in the legs may be a side effect of topiramate. There is no evidence of peripheral neuropathy. EMG and nerve conduction studies done in the hospital was normal.  Chronic migraine headaches  TSH and B12 levels were normal    RECOMMENDATIONS :  Discussed with patient  Recommended vitamin C which may help with the tingling and numbness from Topamax  I will see her back in 2 months  At that time if her symptoms continue we may consider decreasing the topiramate dose slightly. In the meantime I have also recommended diet modifications and recommended that she avoid chocolate and nitrate-containing foods to help decrease her migraine frequency  Thank you for this consultation        Please note a portion of this chart was generated using dragon dictation software. Although every effort was made to ensure the accuracy of this automated transcription, some errors in transcription may have occurred.

## 2021-09-27 LAB
HEPATITIS B SURFACE ANTIGEN INTERPRETATION: NORMAL
HEPATITIS C ANTIBODY INTERPRETATION: NORMAL

## 2021-09-28 LAB
HIV AG/AB: NORMAL
HIV ANTIGEN: NORMAL
HIV-1 ANTIBODY: NORMAL
HIV-2 AB: NORMAL
TOTAL SYPHILLIS IGG/IGM: NORMAL

## 2021-12-02 ENCOUNTER — ANESTHESIA EVENT (OUTPATIENT)
Dept: OPERATING ROOM | Age: 36
End: 2021-12-02
Payer: COMMERCIAL

## 2021-12-08 NOTE — PROGRESS NOTES
Name_______________________________________Printed:____________________  Date and time of surgery__12/15/21____1245__________________Arrival Time:__1115______________   1. The instructions given regarding when and if a patient needs to stop oral intake prior to surgery varies. Follow the specific instructions you were given                  _X__Nothing to eat or to drink after Midnight the night before.                   ____Carbo loading or ERAS instructions will be given to select patients-if you have been given those instructions -please do the following                           The evening before your surgery after dinner before midnight drink 40 ounces of gatorade. If you are diabetic use sugar free. The morning of surgery drink 40 ounces of water. This needs to be finished 3 hours prior to your surgery start time. 2. Take the following pills with a small sip of water on the morning of surgery___NA________________________________________________                  Do not take blood pressure medications ending in pril or sartan the kade prior to surgery or the morning of surgery_   3. Aspirin, Ibuprofen, Advil, Naproxen, Vitamin E and other Anti-inflammatory products and supplements should be stopped for 5 -7days before surgery or as directed by your physician. 4. Check with your Doctor regarding stopping Plavix, Coumadin,Eliquis, Lovenox,Effient,Pradaxa,Xarelto, Fragmin or other blood thinners and follow their instructions. 5. Do not smoke, and do not drink any alcoholic beverages 24 hours prior to surgery. This includes NA Beer. Refrain from the usage of any recreational drugs. 6. You may brush your teeth and gargle the morning of surgery. DO NOT SWALLOW WATER   7. You MUST make arrangements for a responsible adult to stay on site while you are here and take you home after your surgery. You will not be allowed to leave alone or drive yourself home.   It is strongly suggested someone stay with you the first 24 hrs. Your surgery will be cancelled if you do not have a ride home. 8. A parent/legal guardian must accompany a child scheduled for surgery and plan to stay at the hospital until the child is discharged. Please do not bring other children with you. 9. Please wear simple, loose fitting clothing to the hospital.  [place] not bring valuables (money, credit cards, checkbooks, etc.) Do not wear any makeup (including no eye makeup) or nail polish on your fingers or toes. 10. DO NOT wear any jewelry or piercings on day of surgery. All body piercing jewelry must be removed. 11. If you have ___dentures, they will be removed before going to the OR; we will provide you a container. If you wear ___contact lenses or ___glasses, they will be removed; please bring a case for them. 12. Please see your family doctor/pediatrician for a history & physical and/or concerning medications. Bring any test results/reports from your physician's office. PCP__________________Phone___________H&P Appt. Date________             13 If you  have a Living Will and Durable Power of  for Healthcare, please bring in a copy. 15. Notify your Surgeon if you develop any illness between now and surgery  time, cough, cold, fever, sore throat, nausea, vomiting, etc.  Please notify your surgeon if you experience dizziness, shortness of breath or blurred vision between now & the time of your surgery             15. DO NOT shave your operative site 96 hours prior to surgery. For face & neck surgery, men may use an electric razor 48 hours prior to surgery. 16. Shower the night before or morning of surgery using an antibacterial soap or as you have been instructed. 17. To provide excellent care visitors will be limited to one in the room at any given time. 18.  Please bring picture ID and insurance card.              19.  Visit our web site for additional information:  [email or web address]              20.During flu season no children under the age of 15 are permitted in the hospital for the safety of all patients. 21. If you take a long acting insulin in the evening only  take half of your usual  dose the night  before your procedure              22. If you use a c-pap please bring DOS if staying overnight,             23.For your convenience [facility] has a pharmacy on site to fill your prescriptions. 24. If you use oxygen and have a portable tank please bring it  with you the DOS             25. Bring a complete list of all your medications with name and dose include any supplements. 26. Other__________________________________________   *Please call pre admission testing if you any further questions   [facility]         [address]    [place] [address]  [phone]   [facility], [place] [address] ALY DAVIES   [phone]           COVID TESTING    _X__ Covid test to be done 3-5 days prior to scheduled surgery -patient aware they are REQUIRED to bring a copy of the negative result DOS-if they receive a positive result to notify their surgeon         If known - indicate where patient is getting covid test done ___F 12/10________________________________________________________    ___ Rapid - DOS    ___ Other___________________________________      Katja Caul POLICY(subject to change)    There is a one visitor policy at [facility] for all surgeries and endoscopies. Whether the visitor can stay or will be asked to wait in the car will depend on the current policy and if social distancing can be maintained. The policy is subject to change at any time. Please make sure the visitor has a cell phone that is on,charged and able to accept calls, as this may be the way that the staff communicates with them. Pain management is NO VISITOR policyThe patients ride is expected to remain in the car with a cell phone for communication. If the ride is leaving the hospital grounds please make sure they are back in time for pickup. Have the patient inform the staff on arrival what their rides plans are while the patient is in the facility. At the MAIN there is one visitor allowed. Please note that the visitor policy is subject to change. All above information reviewed with patient in person or by phone. Patient verbalizes understanding. All questions and concerns addressed.                                                                                                  Patient/Rep___Patient_________________                                                                                                                                    PRE OP INSTRUCTIONS

## 2021-12-10 ENCOUNTER — HOSPITAL ENCOUNTER (OUTPATIENT)
Age: 36
Discharge: HOME | End: 2021-12-10
Payer: COMMERCIAL

## 2021-12-10 DIAGNOSIS — Z01.818 PREOP TESTING: ICD-10-CM

## 2021-12-10 LAB
BASOPHILS ABSOLUTE: 0 K/UL (ref 0–0.2)
BASOPHILS RELATIVE PERCENT: 0.5 %
EOSINOPHILS ABSOLUTE: 0.1 K/UL (ref 0–0.6)
EOSINOPHILS RELATIVE PERCENT: 1.2 %
HCT VFR BLD CALC: 38.4 % (ref 36–48)
HEMOGLOBIN: 13 G/DL (ref 12–16)
LYMPHOCYTES ABSOLUTE: 2.2 K/UL (ref 1–5.1)
LYMPHOCYTES RELATIVE PERCENT: 30.7 %
MCH RBC QN AUTO: 31.9 PG (ref 26–34)
MCHC RBC AUTO-ENTMCNC: 33.8 G/DL (ref 31–36)
MCV RBC AUTO: 94.6 FL (ref 80–100)
MONOCYTES ABSOLUTE: 0.6 K/UL (ref 0–1.3)
MONOCYTES RELATIVE PERCENT: 8.3 %
NEUTROPHILS ABSOLUTE: 4.3 K/UL (ref 1.7–7.7)
NEUTROPHILS RELATIVE PERCENT: 59.3 %
PDW BLD-RTO: 12.9 % (ref 12.4–15.4)
PLATELET # BLD: 355 K/UL (ref 135–450)
PMV BLD AUTO: 8.6 FL (ref 5–10.5)
RBC # BLD: 4.06 M/UL (ref 4–5.2)
WBC # BLD: 7.3 K/UL (ref 4–11)

## 2021-12-10 PROCEDURE — 85025 COMPLETE CBC W/AUTO DIFF WBC: CPT

## 2021-12-10 PROCEDURE — U0005 INFEC AGEN DETEC AMPLI PROBE: HCPCS

## 2021-12-10 PROCEDURE — U0003 INFECTIOUS AGENT DETECTION BY NUCLEIC ACID (DNA OR RNA); SEVERE ACUTE RESPIRATORY SYNDROME CORONAVIRUS 2 (SARS-COV-2) (CORONAVIRUS DISEASE [COVID-19]), AMPLIFIED PROBE TECHNIQUE, MAKING USE OF HIGH THROUGHPUT TECHNOLOGIES AS DESCRIBED BY CMS-2020-01-R: HCPCS

## 2021-12-10 PROCEDURE — 36415 COLL VENOUS BLD VENIPUNCTURE: CPT

## 2021-12-11 LAB — SARS-COV-2: NOT DETECTED

## 2021-12-15 ENCOUNTER — HOSPITAL ENCOUNTER (OUTPATIENT)
Age: 36
Setting detail: OUTPATIENT SURGERY
Discharge: HOME | End: 2021-12-15
Attending: OBSTETRICS & GYNECOLOGY | Admitting: OBSTETRICS & GYNECOLOGY
Payer: COMMERCIAL

## 2021-12-15 ENCOUNTER — ANESTHESIA (OUTPATIENT)
Dept: OPERATING ROOM | Age: 36
End: 2021-12-15
Payer: COMMERCIAL

## 2021-12-15 VITALS
RESPIRATION RATE: 11 BRPM | OXYGEN SATURATION: 95 % | HEIGHT: 61 IN | TEMPERATURE: 97.2 F | HEART RATE: 91 BPM | WEIGHT: 162.5 LBS | DIASTOLIC BLOOD PRESSURE: 73 MMHG | BODY MASS INDEX: 30.68 KG/M2 | SYSTOLIC BLOOD PRESSURE: 111 MMHG

## 2021-12-15 VITALS
SYSTOLIC BLOOD PRESSURE: 94 MMHG | RESPIRATION RATE: 13 BRPM | DIASTOLIC BLOOD PRESSURE: 53 MMHG | TEMPERATURE: 96.1 F | OXYGEN SATURATION: 100 %

## 2021-12-15 DIAGNOSIS — Z98.890 STATUS POST D&C: ICD-10-CM

## 2021-12-15 DIAGNOSIS — Z01.818 PREOP TESTING: Primary | ICD-10-CM

## 2021-12-15 LAB — HCG(URINE) PREGNANCY TEST: NEGATIVE

## 2021-12-15 PROCEDURE — 3600000004 HC SURGERY LEVEL 4 BASE: Performed by: OBSTETRICS & GYNECOLOGY

## 2021-12-15 PROCEDURE — 84703 CHORIONIC GONADOTROPIN ASSAY: CPT

## 2021-12-15 PROCEDURE — 88305 TISSUE EXAM BY PATHOLOGIST: CPT

## 2021-12-15 PROCEDURE — 3600000014 HC SURGERY LEVEL 4 ADDTL 15MIN: Performed by: OBSTETRICS & GYNECOLOGY

## 2021-12-15 PROCEDURE — 2709999900 HC NON-CHARGEABLE SUPPLY: Performed by: OBSTETRICS & GYNECOLOGY

## 2021-12-15 PROCEDURE — 6360000002 HC RX W HCPCS: Performed by: ANESTHESIOLOGY

## 2021-12-15 PROCEDURE — 7100000010 HC PHASE II RECOVERY - FIRST 15 MIN: Performed by: OBSTETRICS & GYNECOLOGY

## 2021-12-15 PROCEDURE — 3700000000 HC ANESTHESIA ATTENDED CARE: Performed by: OBSTETRICS & GYNECOLOGY

## 2021-12-15 PROCEDURE — 2720000010 HC SURG SUPPLY STERILE: Performed by: OBSTETRICS & GYNECOLOGY

## 2021-12-15 PROCEDURE — 2580000003 HC RX 258: Performed by: OBSTETRICS & GYNECOLOGY

## 2021-12-15 PROCEDURE — 7100000011 HC PHASE II RECOVERY - ADDTL 15 MIN: Performed by: OBSTETRICS & GYNECOLOGY

## 2021-12-15 PROCEDURE — 6360000002 HC RX W HCPCS: Performed by: NURSE ANESTHETIST, CERTIFIED REGISTERED

## 2021-12-15 PROCEDURE — 3700000001 HC ADD 15 MINUTES (ANESTHESIA): Performed by: OBSTETRICS & GYNECOLOGY

## 2021-12-15 PROCEDURE — 7100000001 HC PACU RECOVERY - ADDTL 15 MIN: Performed by: OBSTETRICS & GYNECOLOGY

## 2021-12-15 PROCEDURE — 2500000003 HC RX 250 WO HCPCS: Performed by: NURSE ANESTHETIST, CERTIFIED REGISTERED

## 2021-12-15 PROCEDURE — 7100000000 HC PACU RECOVERY - FIRST 15 MIN: Performed by: OBSTETRICS & GYNECOLOGY

## 2021-12-15 PROCEDURE — 6370000000 HC RX 637 (ALT 250 FOR IP): Performed by: OBSTETRICS & GYNECOLOGY

## 2021-12-15 RX ORDER — ACETAMINOPHEN 500 MG
1000 TABLET ORAL ONCE
Status: COMPLETED | OUTPATIENT
Start: 2021-12-15 | End: 2021-12-15

## 2021-12-15 RX ORDER — HYDROMORPHONE HCL 110MG/55ML
0.5 PATIENT CONTROLLED ANALGESIA SYRINGE INTRAVENOUS EVERY 5 MIN PRN
Status: DISCONTINUED | OUTPATIENT
Start: 2021-12-15 | End: 2021-12-15 | Stop reason: HOSPADM

## 2021-12-15 RX ORDER — FENTANYL CITRATE 50 UG/ML
INJECTION, SOLUTION INTRAMUSCULAR; INTRAVENOUS PRN
Status: DISCONTINUED | OUTPATIENT
Start: 2021-12-15 | End: 2021-12-15 | Stop reason: SDUPTHER

## 2021-12-15 RX ORDER — MAGNESIUM HYDROXIDE 1200 MG/15ML
LIQUID ORAL CONTINUOUS PRN
Status: COMPLETED | OUTPATIENT
Start: 2021-12-15 | End: 2021-12-15

## 2021-12-15 RX ORDER — IBUPROFEN 600 MG/1
600 TABLET ORAL EVERY 6 HOURS PRN
Qty: 30 TABLET | Refills: 1 | Status: SHIPPED | OUTPATIENT
Start: 2021-12-15

## 2021-12-15 RX ORDER — LIDOCAINE HYDROCHLORIDE 20 MG/ML
INJECTION, SOLUTION EPIDURAL; INFILTRATION; INTRACAUDAL; PERINEURAL PRN
Status: DISCONTINUED | OUTPATIENT
Start: 2021-12-15 | End: 2021-12-15 | Stop reason: SDUPTHER

## 2021-12-15 RX ORDER — LABETALOL HYDROCHLORIDE 5 MG/ML
5 INJECTION, SOLUTION INTRAVENOUS EVERY 10 MIN PRN
Status: DISCONTINUED | OUTPATIENT
Start: 2021-12-15 | End: 2021-12-15 | Stop reason: HOSPADM

## 2021-12-15 RX ORDER — GLYCOPYRROLATE 0.2 MG/ML
INJECTION INTRAMUSCULAR; INTRAVENOUS PRN
Status: DISCONTINUED | OUTPATIENT
Start: 2021-12-15 | End: 2021-12-15 | Stop reason: SDUPTHER

## 2021-12-15 RX ORDER — MEPERIDINE HYDROCHLORIDE 25 MG/ML
12.5 INJECTION INTRAMUSCULAR; INTRAVENOUS; SUBCUTANEOUS EVERY 5 MIN PRN
Status: DISCONTINUED | OUTPATIENT
Start: 2021-12-15 | End: 2021-12-15 | Stop reason: HOSPADM

## 2021-12-15 RX ORDER — APREPITANT 40 MG/1
40 CAPSULE ORAL ONCE
Status: COMPLETED | OUTPATIENT
Start: 2021-12-15 | End: 2021-12-15

## 2021-12-15 RX ORDER — LIDOCAINE HYDROCHLORIDE 10 MG/ML
0.5 INJECTION, SOLUTION EPIDURAL; INFILTRATION; INTRACAUDAL; PERINEURAL ONCE
Status: DISCONTINUED | OUTPATIENT
Start: 2021-12-15 | End: 2021-12-15 | Stop reason: HOSPADM

## 2021-12-15 RX ORDER — ONDANSETRON 2 MG/ML
4 INJECTION INTRAMUSCULAR; INTRAVENOUS
Status: DISCONTINUED | OUTPATIENT
Start: 2021-12-15 | End: 2021-12-15 | Stop reason: HOSPADM

## 2021-12-15 RX ORDER — ONDANSETRON 2 MG/ML
INJECTION INTRAMUSCULAR; INTRAVENOUS PRN
Status: DISCONTINUED | OUTPATIENT
Start: 2021-12-15 | End: 2021-12-15 | Stop reason: SDUPTHER

## 2021-12-15 RX ORDER — KETOROLAC TROMETHAMINE 30 MG/ML
INJECTION, SOLUTION INTRAMUSCULAR; INTRAVENOUS PRN
Status: DISCONTINUED | OUTPATIENT
Start: 2021-12-15 | End: 2021-12-15 | Stop reason: SDUPTHER

## 2021-12-15 RX ORDER — SODIUM CHLORIDE 0.9 % (FLUSH) 0.9 %
10 SYRINGE (ML) INJECTION EVERY 12 HOURS SCHEDULED
Status: DISCONTINUED | OUTPATIENT
Start: 2021-12-15 | End: 2021-12-15 | Stop reason: HOSPADM

## 2021-12-15 RX ORDER — PHENYLEPHRINE HCL IN 0.9% NACL 1 MG/10 ML
SYRINGE (ML) INTRAVENOUS PRN
Status: DISCONTINUED | OUTPATIENT
Start: 2021-12-15 | End: 2021-12-15 | Stop reason: SDUPTHER

## 2021-12-15 RX ORDER — SODIUM CHLORIDE, SODIUM LACTATE, POTASSIUM CHLORIDE, CALCIUM CHLORIDE 600; 310; 30; 20 MG/100ML; MG/100ML; MG/100ML; MG/100ML
INJECTION, SOLUTION INTRAVENOUS CONTINUOUS
Status: DISCONTINUED | OUTPATIENT
Start: 2021-12-15 | End: 2021-12-15 | Stop reason: HOSPADM

## 2021-12-15 RX ORDER — LIDOCAINE HYDROCHLORIDE 10 MG/ML
1 INJECTION, SOLUTION EPIDURAL; INFILTRATION; INTRACAUDAL; PERINEURAL
Status: DISCONTINUED | OUTPATIENT
Start: 2021-12-15 | End: 2021-12-15 | Stop reason: HOSPADM

## 2021-12-15 RX ORDER — KETAMINE HCL IN NACL, ISO-OSM 100MG/10ML
SYRINGE (ML) INJECTION PRN
Status: DISCONTINUED | OUTPATIENT
Start: 2021-12-15 | End: 2021-12-15 | Stop reason: SDUPTHER

## 2021-12-15 RX ORDER — HYDROCODONE BITARTRATE AND ACETAMINOPHEN 5; 325 MG/1; MG/1
1 TABLET ORAL EVERY 6 HOURS PRN
Qty: 10 TABLET | Refills: 0 | Status: SHIPPED | OUTPATIENT
Start: 2021-12-15 | End: 2021-12-20

## 2021-12-15 RX ORDER — OXYCODONE HYDROCHLORIDE AND ACETAMINOPHEN 5; 325 MG/1; MG/1
1 TABLET ORAL
Status: DISCONTINUED | OUTPATIENT
Start: 2021-12-15 | End: 2021-12-15 | Stop reason: HOSPADM

## 2021-12-15 RX ORDER — PROPOFOL 10 MG/ML
INJECTION, EMULSION INTRAVENOUS PRN
Status: DISCONTINUED | OUTPATIENT
Start: 2021-12-15 | End: 2021-12-15 | Stop reason: SDUPTHER

## 2021-12-15 RX ORDER — SODIUM CHLORIDE 9 MG/ML
25 INJECTION, SOLUTION INTRAVENOUS PRN
Status: DISCONTINUED | OUTPATIENT
Start: 2021-12-15 | End: 2021-12-15 | Stop reason: HOSPADM

## 2021-12-15 RX ORDER — SODIUM CHLORIDE 0.9 % (FLUSH) 0.9 %
10 SYRINGE (ML) INJECTION PRN
Status: DISCONTINUED | OUTPATIENT
Start: 2021-12-15 | End: 2021-12-15 | Stop reason: HOSPADM

## 2021-12-15 RX ORDER — DEXAMETHASONE SODIUM PHOSPHATE 4 MG/ML
INJECTION, SOLUTION INTRA-ARTICULAR; INTRALESIONAL; INTRAMUSCULAR; INTRAVENOUS; SOFT TISSUE PRN
Status: DISCONTINUED | OUTPATIENT
Start: 2021-12-15 | End: 2021-12-15 | Stop reason: SDUPTHER

## 2021-12-15 RX ORDER — HYDRALAZINE HYDROCHLORIDE 20 MG/ML
5 INJECTION INTRAMUSCULAR; INTRAVENOUS EVERY 10 MIN PRN
Status: DISCONTINUED | OUTPATIENT
Start: 2021-12-15 | End: 2021-12-15 | Stop reason: HOSPADM

## 2021-12-15 RX ORDER — MIDAZOLAM HYDROCHLORIDE 1 MG/ML
INJECTION INTRAMUSCULAR; INTRAVENOUS PRN
Status: DISCONTINUED | OUTPATIENT
Start: 2021-12-15 | End: 2021-12-15 | Stop reason: SDUPTHER

## 2021-12-15 RX ADMIN — GLYCOPYRROLATE 0.2 MG: 0.2 INJECTION, SOLUTION INTRAMUSCULAR; INTRAVENOUS at 12:52

## 2021-12-15 RX ADMIN — Medication 20 MG: at 12:57

## 2021-12-15 RX ADMIN — APREPITANT 40 MG: 40 CAPSULE ORAL at 11:55

## 2021-12-15 RX ADMIN — FENTANYL CITRATE 50 MCG: 50 INJECTION, SOLUTION INTRAMUSCULAR; INTRAVENOUS at 12:46

## 2021-12-15 RX ADMIN — Medication 100 MCG: at 12:48

## 2021-12-15 RX ADMIN — Medication 100 MCG: at 12:52

## 2021-12-15 RX ADMIN — HYDROMORPHONE HYDROCHLORIDE 0.5 MG: 2 INJECTION, SOLUTION INTRAMUSCULAR; INTRAVENOUS; SUBCUTANEOUS at 13:55

## 2021-12-15 RX ADMIN — MIDAZOLAM 2 MG: 1 INJECTION INTRAMUSCULAR; INTRAVENOUS at 12:36

## 2021-12-15 RX ADMIN — GLYCOPYRROLATE 0.1 MG: 0.2 INJECTION, SOLUTION INTRAMUSCULAR; INTRAVENOUS at 12:46

## 2021-12-15 RX ADMIN — ONDANSETRON 4 MG: 2 INJECTION INTRAMUSCULAR; INTRAVENOUS at 12:46

## 2021-12-15 RX ADMIN — PROPOFOL 170 MG: 10 INJECTION, EMULSION INTRAVENOUS at 12:42

## 2021-12-15 RX ADMIN — FENTANYL CITRATE 50 MCG: 50 INJECTION, SOLUTION INTRAMUSCULAR; INTRAVENOUS at 12:40

## 2021-12-15 RX ADMIN — DEXAMETHASONE SODIUM PHOSPHATE 8 MG: 4 INJECTION, SOLUTION INTRAMUSCULAR; INTRAVENOUS at 12:46

## 2021-12-15 RX ADMIN — Medication 10 MG: at 13:04

## 2021-12-15 RX ADMIN — SODIUM CHLORIDE, POTASSIUM CHLORIDE, SODIUM LACTATE AND CALCIUM CHLORIDE: 600; 310; 30; 20 INJECTION, SOLUTION INTRAVENOUS at 12:36

## 2021-12-15 RX ADMIN — GLYCOPYRROLATE 0.1 MG: 0.2 INJECTION, SOLUTION INTRAMUSCULAR; INTRAVENOUS at 12:49

## 2021-12-15 RX ADMIN — LIDOCAINE HYDROCHLORIDE 80 MG: 20 INJECTION, SOLUTION EPIDURAL; INFILTRATION; INTRACAUDAL; PERINEURAL at 12:41

## 2021-12-15 RX ADMIN — KETOROLAC TROMETHAMINE 30 MG: 30 INJECTION, SOLUTION INTRAMUSCULAR at 13:04

## 2021-12-15 RX ADMIN — HYDROMORPHONE HYDROCHLORIDE 0.5 MG: 2 INJECTION, SOLUTION INTRAMUSCULAR; INTRAVENOUS; SUBCUTANEOUS at 13:41

## 2021-12-15 RX ADMIN — ACETAMINOPHEN 1000 MG: 500 TABLET ORAL at 11:55

## 2021-12-15 ASSESSMENT — PAIN SCALES - GENERAL
PAINLEVEL_OUTOF10: 0
PAINLEVEL_OUTOF10: 7
PAINLEVEL_OUTOF10: 5
PAINLEVEL_OUTOF10: 7

## 2021-12-15 ASSESSMENT — PULMONARY FUNCTION TESTS
PIF_VALUE: 13
PIF_VALUE: 12
PIF_VALUE: 10
PIF_VALUE: 1
PIF_VALUE: 11
PIF_VALUE: 10
PIF_VALUE: 0
PIF_VALUE: 0
PIF_VALUE: 12
PIF_VALUE: 2
PIF_VALUE: 10
PIF_VALUE: 1
PIF_VALUE: 12
PIF_VALUE: 4
PIF_VALUE: 10
PIF_VALUE: 10
PIF_VALUE: 3
PIF_VALUE: 12
PIF_VALUE: 12
PIF_VALUE: 10
PIF_VALUE: 10
PIF_VALUE: 4
PIF_VALUE: 12
PIF_VALUE: 11
PIF_VALUE: 2
PIF_VALUE: 12
PIF_VALUE: 16
PIF_VALUE: 10
PIF_VALUE: 12
PIF_VALUE: 1
PIF_VALUE: 12

## 2021-12-15 ASSESSMENT — LIFESTYLE VARIABLES: SMOKING_STATUS: 0

## 2021-12-15 ASSESSMENT — PAIN DESCRIPTION - PAIN TYPE
TYPE: SURGICAL PAIN

## 2021-12-15 ASSESSMENT — PAIN DESCRIPTION - LOCATION
LOCATION: ABDOMEN

## 2021-12-15 ASSESSMENT — PAIN DESCRIPTION - DESCRIPTORS
DESCRIPTORS: CRAMPING

## 2021-12-15 ASSESSMENT — PAIN - FUNCTIONAL ASSESSMENT: PAIN_FUNCTIONAL_ASSESSMENT: 0-10

## 2021-12-15 NOTE — ANESTHESIA POSTPROCEDURE EVALUATION
Department of Anesthesiology  Postprocedure Note    Patient: Leslie Stock  MRN: [id number]  YOB: 1985  Date of evaluation: 12/15/2021  Time:  1:33 PM     Procedure Summary     Date: 12/15/21 Room / Location: [facility] OR [address] [place] [address]    Anesthesia Start: 5529 Anesthesia Stop: 6439    Procedure: HYSTEROSCOPY  WITH DILATATION AND CURETTAGE AND NOVASURE ENDOMETRIAL ABLATION (N/A Vagina ) Diagnosis: (N93.9  ABNORMAL UTERINE BLEEDING, NOT CONTROLLED WITH MEDICAL TREATMENT)    Surgeons: Iliana Becerril MD Responsible Provider: Jada Dykes MD    Anesthesia Type: general ASA Status: 2          Anesthesia Type: general    Sheng Phase I: Sheng Score: 5    Sheng Phase II:      Last vitals: Reviewed and per EMR flowsheets.        Anesthesia Post Evaluation    Patient location during evaluation: PACU  Patient participation: complete - patient participated  Level of consciousness: awake and alert  Airway patency: patent  Nausea & Vomiting: no vomiting and no nausea  Complications: no  Cardiovascular status: hemodynamically stable  Respiratory status: acceptable  Hydration status: stable

## 2021-12-15 NOTE — H&P
Date of Surgery Update:  Kaylene Mcfarland was seen, history and physical examination reviewed, and patient examined by me today. There have been no significant clinical changes since the completion of the previous history and physical.    The risk, benefits, and alternatives of the proposed procedure have been explained to the patient (or appropriate guardian) and understanding verbalized. All questions answered. Patient wishes to proceed.     Electronically signed by: Romero Mckeon MD,12/15/2021,1:14 PM

## 2021-12-15 NOTE — ANESTHESIA PRE PROCEDURE
Department of Anesthesiology  Preprocedure Note       Name:  Vinicius Farmer   Age:  43 y.o.  :  1985                                          MRN:  [id number]         Date:  12/15/2021      Surgeon: Deloris Doss):  Arthur Vick MD    Procedure: Procedure(s): HYSTEROSCOPY  WITH DILATATION AND CURETTAGE AND NOVASURE ENDOMETRIAL ABLATION    Medications prior to admission:   Prior to Admission medications    Medication Sig Start Date End Date Taking?  Authorizing Provider   acetaminophen (TYLENOL) 500 MG tablet Take 500 mg by mouth every 6 hours as needed for Pain    Yes Historical Provider, MD   sertraline (ZOLOFT) 100 MG tablet Take 100 mg by mouth daily    Yes Historical Provider, MD       Current medications:    Current Facility-Administered Medications   Medication Dose Route Frequency Provider Last Rate Last Admin    lactated ringers infusion   IntraVENous Continuous Donya Vizcarra MD        lidocaine PF 1 % injection 0.5 mL  0.5 mL IntraDERmal Once Arthur Vick MD        acetaminophen (TYLENOL) tablet 1,000 mg  1,000 mg Oral Once Arthur Vick MD        meperidine (DEMEROL) injection 12.5 mg  12.5 mg IntraVENous Q5 Min PRN Elmer Cantrell MD        HYDROmorphone (DILAUDID) injection 0.5 mg  0.5 mg IntraVENous Q5 Min PRN Elmer Cantrell MD        oxyCODONE-acetaminophen (PERCOCET) 5-325 MG per tablet 1 tablet  1 tablet Oral Once PRN Elmer Cantrell MD        [facility]etron [facility]) injection 4 mg  4 mg IntraVENous Once PRN Elmer Cantrell MD        labetalol (NORMODYNE;TRANDATE) injection 5 mg  5 mg IntraVENous Q10 Min PRN Elmer Cantrell MD        hydrALAZINE (APRESOLINE) injection 5 mg  5 mg IntraVENous Q10 Min PRN Elmer Cantrell MD        aprepitant (EMEND) capsule 40 mg  40 mg Oral Once Elmer Cantrell MD           Allergies:  No Known Allergies    Problem List:    Patient Active Problem List   Diagnosis Code    DDD (degenerative disc disease), lumbosacral M51.37    Neoplasm of uncertain behavior of skin D48.5    Rhytides L98.8    Paresthesia of skin R20.2       Past Medical History:        Diagnosis Date    Arthritis     Hearing loss     Lumbar dislocation     Migraines     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    Tinnitus        Past Surgical History:        Procedure Laterality Date    MOUTH SURGERY      OTHER SURGICAL HISTORY      essure procedure    TONSILLECTOMY         Social History:    Social History     Tobacco Use    Smoking status: Never Smoker    Smokeless tobacco: Never Used    Tobacco comment: quit many years ago   Substance Use Topics    Alcohol use: No     Comment: occ                                Counseling given: Not Answered  Comment: quit many years ago      Vital Signs (Current):   Vitals:    12/08/21 1542   Weight: 163 lb (73.9 kg)   Height: 5' 1\" (1.549 m)                                              BP Readings from Last 3 Encounters:   07/14/21 103/70   12/26/19 109/67   12/12/19 116/72       NPO Status:                                                                                 BMI:   Wt Readings from Last 3 Encounters:   12/08/21 163 lb (73.9 kg)   07/14/21 143 lb (64.9 kg)   10/28/20 163 lb (73.9 kg)     Body mass index is 30.8 kg/m².     CBC:   Lab Results   Component Value Date    WBC 7.3 12/10/2021    RBC 4.06 12/10/2021    HGB 13.0 12/10/2021    HCT 38.4 12/10/2021    MCV 94.6 12/10/2021    RDW 12.9 12/10/2021     12/10/2021       CMP:   Lab Results   Component Value Date     03/30/2021    K 4.3 03/30/2021     03/30/2021    CO2 17 03/30/2021    BUN 13 03/30/2021    CREATININE <0.5 03/30/2021    GFRAA >60 03/30/2021    GFRAA >60 10/18/2012    AGRATIO 1.7 03/30/2021    LABGLOM >60 03/30/2021    GLUCOSE 83 03/30/2021    PROT 6.8 03/30/2021    PROT 7.3 10/18/2012    CALCIUM 8.9 03/30/2021    BILITOT <0.2 03/30/2021    ALKPHOS 63 03/30/2021    AST 14 03/30/2021    ALT 14 03/30/2021       POC Tests: No results for input(s): POCGLU, POCNA, POCK, POCCL, POCBUN, POCHEMO, POCHCT in the last 72 hours. Coags: No results found for: PROTIME, INR, APTT    HCG (If Applicable):   Lab Results   Component Value Date    PREGTESTUR Negative 03/07/2018        ABGs: No results found for: PHART, PO2ART, XGI0QRR, OCJ4BBX, BEART, G6GHSYVD     Type & Screen (If Applicable):  No results found for: LABABO, LABRH    Drug/Infectious Status (If Applicable):  No results found for: HIV, HEPCAB    COVID-19 Screening (If Applicable):   Lab Results   Component Value Date    COVID19 Not Detected 12/10/2021    COVID19 NOT DETECTED 01/18/2021           Anesthesia Evaluation  Patient summary reviewed and Nursing notes reviewed no history of anesthetic complications:   Airway: Mallampati: II  TM distance: >3 FB   Neck ROM: full  Mouth opening: > = 3 FB Dental: normal exam         Pulmonary:normal exam  breath sounds clear to auscultation      (-) COPD, asthma, sleep apnea and not a current smoker                           Cardiovascular:Negative CV ROS        (-) hypertension, past MI, CAD, CABG/stent, dysrhythmias,  angina and  CHF    ECG reviewed  Rhythm: regular  Rate: normal                    Neuro/Psych:   (+) neuromuscular disease (lumbar ddd):, headaches: migraine headaches, depression/anxiety    (-) seizures, TIA and CVA           GI/Hepatic/Renal: Neg GI/Hepatic/Renal ROS       (-) GERD, liver disease and no renal disease       Endo/Other:    (+) : arthritis: OA., .    (-) diabetes mellitus, hypothyroidism, hyperthyroidism               Abdominal:   (+) obese,           Vascular: Other Findings:             Anesthesia Plan      general     ASA 2       Induction: intravenous. MIPS: Postoperative opioids intended and Prophylactic antiemetics administered. Anesthetic plan and risks discussed with patient. Plan discussed with CRNA.                   Ubaldo Cain,

## 2021-12-15 NOTE — PROGRESS NOTES
Discharge instructions and new medication prescriptions reviewed with pt and pts friend at bedside, both verbalized understanding. Pt safely dressed and transferred self to wheelchair, IV removed without complications. Pt discharged in wheelchair with all belongings to car by this RN, pt tolerated well.

## 2021-12-15 NOTE — PROGRESS NOTES
Patient transferred from OR to PACU, VSS and O2 sat maintained on 8L via simple mask. Oral airway in place, patient does not respond to stimuli at this time. Peripad with scant bloody drainage. Continue to monitor.

## 2021-12-15 NOTE — BRIEF OP NOTE
Preoperative Dz:  Menorrhagia  Postoperative Dz: same  Procedure: Hysteroscopy, D&C, Novasure ablation  Surgeon: Dr. Talbot Officer: general  Complications: none  EBL: less than 20ml  Finding: normal endometrial cavity  Specimen: endometrial curretage to pathology

## 2021-12-15 NOTE — PROGRESS NOTES
Pt resting quietly in bed, awake, states cramping pain is tolerable. VSS, O2 sats 96% on room air. Sharmila-pad remains in place, small amount of sanguinous drainage. Pt seen by anesthesia, phase 1 criteria met. Will discharge pt from PACU.

## 2021-12-16 NOTE — OP NOTE
[facility]                     [facility], [address]                                OPERATIVE REPORT    PATIENT NAME: Luba Koyanagi             :        1985  MED REC NO:   [id number]                          ROOM:  ACCOUNT NO:   [de-identified]                           ADMIT DATE: 12/15/2021  PROVIDER:     Maciel Biggs MD    DATE OF PROCEDURE:  12/15/2021    PREOPERATIVE DIAGNOSIS:  Menorrhagia, not controlled with medical  treatment. POSTOPERATIVE DIAGNOSIS:  Menorrhagia, not controlled with medical  treatment. OPERATION PERFORMED:  Hysteroscopy, D and C, NovaSure endometrial  ablation. SURGEON:  Maciel Biggs MD    ANESTHESIA:  General with LMA. COMPLICATIONS:  None. ESTIMATED BLOOD LOSS:  Minimal.    FINDINGS:  Normal endometrial cavity with tubal ostia. OPERATIVE PROCEDURE:  After informed consent was obtained, the patient  was taken to operating room where general anesthesia was performed  without difficulty. She was prepared and draped in the normal sterile  fashion in dorsal lithotomy position. The bladder was drained with red  rubber catheter. A speculum was placed in the vagina and single-tooth  tenaculum was placed on the anterior lip of the cervix and cervix was  serially dilated. The uterus was gently sounded to 5.5 cm. MyoSure  hysteroscope was introduced in the uterus and normal shape and size  endometrial cavity with normal endometrium was noted. Hysteroscope was  removed and sharp curettage was performed. The hysteroscope was  reintroduced and no evidence of perforation was noted. At this time,  NovaSure device was introduced in the uterine cavity, deployed, and  cavity width was noted to be 4.6 cm. Cavity assessment did pass and at  power of 139 pandey, ablation cycle started. It lasted 1 minute and 1  second.   After completion of the cycle, NovaSure device was removed and  hysteroscope was reintroduced in the uterine cavity and uniformly  blanched endometrium was noted. At this time, all instruments were  removed. Procedure was terminated. The patient tolerated the procedure  well. All instrument counts were correct x2. The patient was taken to  recovery room in stable condition. SPECIMENS:  Endometrial curettings sent to Pathology.         Mirella Rosado MD    D: 12/15/2021 13:53:38       T: 12/16/2021 0:31:37     [id number]  Job#: [id number]     Doc#: [id number]    CC:

## (undated) DEVICE — KIT THERMOABLATION 6MM ENDOMET DEV NOVASURE

## (undated) DEVICE — SET FLD MGMT OUTFLO TB DISP FOR CTRL SYS AQUILEX

## (undated) DEVICE — Z DISCONTINUED USE 2272117 DRAPE SURG 3 QTR N INVASIVE 2 LAYR DISP

## (undated) DEVICE — LIGHT HANDLE: Brand: DEVON

## (undated) DEVICE — D & C-LF: Brand: MEDLINE INDUSTRIES, INC.

## (undated) DEVICE — SOLUTION IRRIG 3000ML 1.5% GL USP UROMATIC CONT

## (undated) DEVICE — SET FLD MGMT CTRL SYS INFLO TB AQUILEX

## (undated) DEVICE — DRAPE,REIN 53X77,STERILE: Brand: MEDLINE

## (undated) DEVICE — GLOVE SURG SZ 65 THK91MIL LTX FREE SYN POLYISOPRENE

## (undated) DEVICE — BOWL MED L 32OZ PLAS W/ MOLD GRAD EZ OPN PEEL PCH